# Patient Record
Sex: MALE | Race: WHITE | URBAN - METROPOLITAN AREA
[De-identification: names, ages, dates, MRNs, and addresses within clinical notes are randomized per-mention and may not be internally consistent; named-entity substitution may affect disease eponyms.]

---

## 2017-08-08 ENCOUNTER — THERAPY VISIT (OUTPATIENT)
Dept: PHYSICAL THERAPY | Facility: CLINIC | Age: 62
End: 2017-08-08
Payer: COMMERCIAL

## 2017-08-08 DIAGNOSIS — M54.12 CERVICAL RADICULOPATHY: Primary | ICD-10-CM

## 2017-08-08 PROCEDURE — 97140 MANUAL THERAPY 1/> REGIONS: CPT | Mod: GP | Performed by: PHYSICAL THERAPIST

## 2017-08-08 PROCEDURE — 97110 THERAPEUTIC EXERCISES: CPT | Mod: GP | Performed by: PHYSICAL THERAPIST

## 2017-08-08 PROCEDURE — 97161 PT EVAL LOW COMPLEX 20 MIN: CPT | Mod: GP | Performed by: PHYSICAL THERAPIST

## 2017-08-08 NOTE — PROGRESS NOTES
[facility] Initial Evaluation      Subjective:    Patient is a 61 year old male presenting with rehab cervical spine hpi.   Giovanni Sapp is a 61 year old male with a cervical spine condition.  Condition occurred with:  Insidious onset.    This is a recurrent and new condition  Patient saw MD on 8/7/17 for recent flare-up of neck pain on 7/31/17. Patient reports a previous episode of similar symptoms 2-3 years ago.    Site of Pain: R scapula, currently no neck pain.  Radiates to:  Upper arm right, lower arm right and hand right (middle finger).  Pain is described as burning, sharp, shooting and aching and is constant and reported as 4/10 and 7/10 (at its worst).  Associated symptoms:  Tingling, loss of motion/stiffness, loss of strength and numbness. Pain is worse in the A.M..  Symptoms are exacerbated by looking up or down, lying down, lifting, driving and sitting (shaving) Relieved by: bending head forward.  Since onset symptoms are gradually worsening.  Special testing: none.  Previous treatment includes physical therapy (2-3 years ago).  There was significant improvement following previous treatment.  General health as reported by patient is good.  Pertinent medical history includes:  Sleep disorder/apnea.  Medical allergies: yes (Latex).  Other surgeries include:  Orthopedic surgery (feet, s/p gall bladder removal).  Medication history: Dose medrol pack.  Current occupation is .  Patient is working in normal job without restrictions.  Primary job tasks include:  Prolonged sitting (computer).    Barriers include:  None as reported by the patient.    Red flags:  None as reported by the patient.                        Objective:          Flexibility/Screens:     Upper Extremity:    Decreased left upper extremity flexibility at:  Pectoralis Major and Pectoralis Minor    Decreased right upper extremity flexibility present at:  Pectoralis Major and Pectoralis Minor                       Cervical/Thoracic Evaluation      Cervical Myotomes:        C4 (shrug):  Left: 5    Right: 5  C5 (Deltoid):  Left: 5    Right: 4+  C6 (Biceps):  Left: 5    Right: 4+  C7 (Triceps):  Left: 5    Right: 4          Cervical Dermatomes:                C7 right:  Hypo-light touch              Spinal Segmental Conclusions:  Painful at C6,7  Level:  Hypo at T2, T1, C7, C6 and C5                                                Annel Cervical Evaluation    Posture:  Sitting: poor  Standing: poor  Protruding Head: yes  Wry Neck: no  Correction of Posture: worse    Movement Loss:  Protrusion (PRO): nil  Flexion (Flex): nil  Retraction (RET): min and pain  Extension (EXT): min and pain  Lateral Flexion Right (LF R): min  Lateral Flexion Left (LF L): mod and pain  Rotation Right (ROT R): min  Rotation Left (ROT L): min and pain  Test Movements:  Pretest Pain Sitting: R posterior upper arm, extensor surface forarm, hand  PRO: During: no effect  After: no effect  Mechanical Response: no effect  Repeat PRO: During: no effect  After: no effect  Mechanical Response: no effect  RET: During: increases  After: peripheralizing  Mechanical Response: no effect  Repeat RET: During: increases  After: peripheralizing  Mechanical Response: no effect  RET EXT: During: increases  After: peripheralizing  Mechanical Response: no effect  Repeat RET EXT: During: increases  After: peripheralizing  Mechanical Response: DecrROM  Pretest Pain Lying: R posterior upper arm, extensor surface forarm, hand  RET (Lying): During: increases  After: peripheralizing  Mechanical Response: no effect  Repeat RET (Lying): During: increases  After: peripheralizing  Mechanical Response: no effect    Pretest Pain Sitting: R posterior upper arm, extensor surface forarm, hand  LF R: During: no effect  After: no effect  Mechanical Response: no effect  Repeat LF R: During: decreases  After: better  Mechanical Response: no effect              Conclusion: derangement (vs  other)  Principle of Treatment:  Posture Correction: slouch/over correct, location of neutral spine, use of lumbar support, pt educationreagrding centralization, issue TYON book      Lateral: seated R SB  Other: consider traction(mechanical and/or manual)                                         ROS    Assessment/Plan:      Patient is a 61 year old male with cervical complaints.    Patient has the following significant findings with corresponding treatment plan.                Diagnosis 1:  Cervical radiculopathy  Pain -  hot/cold therapy, mechanical traction, manual therapy, splint/taping/bracing/orthotics, self management, education, directional preference exercise and home program  Decreased ROM/flexibility - manual therapy, therapeutic exercise and home program  Decreased joint mobility - manual therapy, therapeutic exercise, therapeutic activity and home program  Decreased strength - therapeutic exercise, therapeutic activities and home program  Decreased function - therapeutic activities and home program  Impaired posture - neuro re-education, therapeutic activities and home program    Therapy Evaluation Codes:   1) History comprised of:   Personal factors that impact the plan of care:      Profession.    Comorbidity factors that impact the plan of care are:      None.     Medications impacting care: None.  2) Examination of Body Systems comprised of:   Body structures and functions that impact the plan of care:      Cervical spine.   Activity limitations that impact the plan of care are:      Driving, Lifting, Reading/Computer work, Sitting and Working.  3) Clinical presentation characteristics are:   Stable/Uncomplicated.  4) Decision-Making    Low complexity using standardized patient assessment instrument and/or measureable assessment of functional outcome.  Cumulative Therapy Evaluation is: Low complexity.    Previous and current functional limitations:  (See Goal Flow Sheet for this information)    Short  term and Long term goals: (See Goal Flow Sheet for this information)     Communication ability:  Patient appears to be able to clearly communicate and understand verbal and written communication and follow directions correctly.  Treatment Explanation - The following has been discussed with the patient:   RX ordered/plan of care  Anticipated outcomes  Possible risks and side effects  This patient would benefit from PT intervention to resume normal activities.   Rehab potential is good.    Frequency:  2 X week, once daily  Duration:  for 3 weeks  Discharge Plan:  Achieve all LTG.  Independent in home treatment program.  Reach maximal therapeutic benefit.    Please refer to the daily flowsheet for treatment today, total treatment time and time spent performing 1:1 timed codes.

## 2017-08-08 NOTE — LETTER
[facility]  [address]  [place] [address]  [phone]    2017    Re: Giovanni Sapp   :   1955  MRN:  [id number]   REFERRING PHYSICIAN:   Burak Perez    [facility] PHYSICAL [facility]    Date of Initial Evaluation: 2017  Visits:  Rxs Used: 1  Reason for Referral:  Cervical radiculopathy    EVALUATION SUMMARY    Subjective:  Patient is a 61 year old male presenting with rehab cervical spine hpi.   Giovanni Sapp is a 61 year old male with a cervical spine condition.  Condition occurred with:  Insidious onset. This is a recurrent and new condition  Patient saw MD on 17 for recent flare-up of neck pain on 17. Patient reports a previous episode of similar symptoms 2-3 years ago.    Site of Pain: R scapula, currently no neck pain.  Radiates to:  Upper arm right, lower arm right and hand right (middle finger).  Pain is described as burning, sharp, shooting and aching and is constant and reported as 4/10 and 7/10 (at its worst).  Associated symptoms:  Tingling, loss of motion/stiffness, loss of strength and numbness. Pain is worse in the A.M..  Symptoms are exacerbated by looking up or down, lying down, lifting, driving and sitting (shaving) Relieved by: bending head forward.  Since onset symptoms are gradually worsening.  Special testing: none.  Previous treatment includes physical therapy (2-3 years ago).  There was significant improvement following previous treatment.  General health as reported by patient is good.  Pertinent medical history includes:  Sleep disorder/apnea.  Medical allergies: yes (Latex).  Other surgeries include:  Orthopedic surgery (feet, s/p gall bladder removal).  Medication history: Dose medrol pack.  Current occupation is .  Patient is working in normal job without restrictions.  Primary job tasks include:  Prolonged sitting  (computer).  Barriers include:  None as reported by the patient.  Red flags:  None as reported by the patient.                  Objective:  Flexibility/Screens:   Upper Extremity:    Decreased left upper extremity flexibility at:  Pectoralis Major and Pectoralis Minor  Decreased right upper extremity flexibility present at:  Pectoralis Major and Pectoralis Minor          Cervical/Thoracic Evaluation  Cervical Myotomes:    C4 (shrug):  Left: 5    Right: 5  C5 (Deltoid):  Left: 5    Right: 4+  Re: Giovanni Sapp   :   1955    C6 (Biceps):  Left: 5    Right: 4+  C7 (Triceps):  Left: 5    Right: 4  Cervical Dermatomes:    C7 right:  Hypo-light touch    Spinal Segmental Conclusions:  Painful at C6,7  Level:  Hypo at T2, T1, C7, C6 and C5    Annel Cervical Evaluation  Posture:  Sitting: poor  Standing: poor  Protruding Head: yes  Wry Neck: no  Correction of Posture: worse  Movement Loss:  Protrusion (PRO): nil  Flexion (Flex): nil  Retraction (RET): min and pain  Extension (EXT): min and pain  Lateral Flexion Right (LF R): min  Lateral Flexion Left (LF L): mod and pain  Rotation Right (ROT R): min  Rotation Left (ROT L): min and pain  Test Movements:  Pretest Pain Sitting: R posterior upper arm, extensor surface forarm, hand  PRO: During: no effect  After: no effect  Mechanical Response: no effect  Repeat PRO: During: no effect  After: no effect  Mechanical Response: no effect  RET: During: increases  After: peripheralizing  Mechanical Response: no effect  Repeat RET: During: increases  After: peripheralizing  Mechanical Response: no effect  RET EXT: During: increases  After: peripheralizing  Mechanical Response: no effect  Repeat RET EXT: During: increases  After: peripheralizing  Mechanical Response: DecrROM  Pretest Pain Lying: R posterior upper arm, extensor surface forarm, hand  RET (Lying): During: increases  After: peripheralizing  Mechanical Response: no effect  Repeat RET (Lying): During: increases   After: peripheralizing  Mechanical Response: no effect  Pretest Pain Sitting: R posterior upper arm, extensor surface forarm, hand  LF R: During: no effect  After: no effect  Mechanical Response: no effect  Repeat LF R: During: decreases  After: better  Mechanical Response: no effect  Conclusion: derangement (vs other)  Principle of Treatment:  Posture Correction: slouch/over correct, location of neutral spine, use of lumbar support, pt educationreagrding centralization, issue TYON book  Lateral: seated R SB  Other: consider traction(mechanical and/or manual)        Assessment/Plan:    Patient is a 61 year old male with cervical complaints.    Patient has the following significant findings with corresponding treatment plan.                Diagnosis 1:  Cervical radiculopathy  Pain -  hot/cold therapy, mechanical traction, manual therapy, splint/taping/bracing/orthotics, self management, education, directional   Re: Giovanni Sapp   :   1955    preference exercise and home program  Decreased ROM/flexibility - manual therapy, therapeutic exercise and home program  Decreased joint mobility - manual therapy, therapeutic exercise, therapeutic activity and home program  Decreased strength - therapeutic exercise, therapeutic activities and home program  Decreased function - therapeutic activities and home program  Impaired posture - neuro re-education, therapeutic activities and home program    Therapy Evaluation Codes:   1) History comprised of:   Personal factors that impact the plan of care:      Profession.    Comorbidity factors that impact the plan of care are:      None.     Medications impacting care: None.  2) Examination of Body Systems comprised of:   Body structures and functions that impact the plan of care:      Cervical spine.   Activity limitations that impact the plan of care are:      Driving, Lifting, Reading/Computer work, Sitting and Working.  3) Clinical presentation characteristics  are:   Stable/Uncomplicated.  4) Decision-Making    Low complexity using standardized patient assessment instrument and/or measureable assessment of functional outcome.  Cumulative Therapy Evaluation is: Low complexity.    Previous and current functional limitations:  (See Goal Flow Sheet for this information)    Short term and Long term goals: (See Goal Flow Sheet for this information)   Communication ability:  Patient appears to be able to clearly communicate and understand verbal and written communication and follow directions correctly.  Treatment Explanation - The following has been discussed with the patient:   RX ordered/plan of care  Anticipated outcomes  Possible risks and side effects  This patient would benefit from PT intervention to resume normal activities.   Rehab potential is good.  Frequency:  2 X week, once daily  Duration:  for 3 weeks  Discharge Plan:  Achieve all LTG.  Independent in home treatment program.  Reach maximal therapeutic benefit.     Thank you for your referral.    INQUIRIES  Therapist: Yana Arriola, PT  [facility]  [address] [address]  Phone: [phone]   Fax: [phone]

## 2017-08-08 NOTE — MR AVS SNAPSHOT
After Visit Summary   8/8/2017    Giovanni Sapp    MRN: [id number]           Patient Information     Date Of Birth          1955        Visit Information        Provider Department      8/8/2017 9:40 AM Yana Damico PT [facility] [employer] [facility] Physical [facility]        Today's Diagnoses     Cervical radiculopathy    -  1       Follow-ups after your visit        Your next 10 appointments already scheduled     Aug 11, 2017  9:00 AM CDT   ASIYA Spine with Yana Bautista PT   [facility] ([facility])    [address] [place] [address]   [phone]            Aug 15, 2017  1:30 PM CDT   ASIYA Spine with Yana Bautista PT   [facility] [employer] [facility] Physical [facility] ([facility])    [address] [place] [id number]   [phone]              Who to contact     If you have questions or need follow up information about today's clinic visit or your schedule please contact [facility] PHYSICAL [facility] directly at [phone].  Normal or non-critical lab and imaging results will be communicated to you by MyChart, letter or phone within 4 business days after the clinic has received the results. If you do not hear from us within 7 days, please contact the clinic through MyChart or phone. If you have a critical or abnormal lab result, we will notify you by phone as soon as possible.  Submit refill requests through [employer] or call your pharmacy and they will forward the refill request to us. Please allow 3 business days for your refill to be completed.          Additional Information About Your Visit        [employer] Information     [employer] lets you send messages to your doctor, view your test results, renew your prescriptions, schedule appointments and more. To  "sign up, go to [email or web address] . Click on \"Log in\" on the left side of the screen, which will take you to the Welcome page. Then click on \"Sign up Now\" on the right side of the page.     You will be asked to enter the access code listed below, as well as some personal information. Please follow the directions to create your username and password.     Your access code is: [email or web address]  Expires: 2017 12:34 AM     Your access code will  in 90 days. If you need help or a new code, please call your [facility] clinic or [phone].        Care EveryWhere ID     This is your Care EveryWhere ID. This could be used by other organizations to access your [facility] medical records  [id number]         Blood Pressure from Last 3 Encounters:   No data found for BP    Weight from Last 3 Encounters:   No data found for Wt              We Performed the Following     HC PT EVAL, LOW COMPLEXITY     ASIYA INITIAL EVAL REPORT     MANUAL THER TECH,1+REGIONS,EA 15 MIN     THERAPEUTIC EXERCISES        Primary Care Provider    None Specified       No primary provider on file.        Equal Access to Services     [facility]: Hadii cadence Lovett, watony earl, qamarissa watson, michael camarena . So [facility] [phone].    ATENCIÓN: Si habla español, tiene a fernández disposición servicios gratuitos de asistencia lingüística. Ednaame al [phone].    We comply with applicable federal civil rights laws and [place] laws. We do not discriminate on the basis of race, color, national origin, age, disability sex, sexual orientation or gender identity.            Thank you!     Thank you for choosing [facility] PHYSICAL THERAP[facility]  for your care. Our goal is always to provide you with excellent care. Hearing back from our patients is one way we can continue to improve our services. Please take a few minutes to complete the written survey that you may receive " in the mail after your visit with us. Thank you!             Your Updated Medication List - Protect others around you: Learn how to safely use, store and throw away your medicines at www.disposemymeds.org.      Notice  As of 8/8/2017 11:59 PM    You have not been prescribed any medications.

## 2017-08-09 PROBLEM — M54.12 CERVICAL RADICULOPATHY: Status: ACTIVE | Noted: 2017-08-09

## 2017-08-11 ENCOUNTER — THERAPY VISIT (OUTPATIENT)
Dept: PHYSICAL THERAPY | Facility: CLINIC | Age: 62
End: 2017-08-11
Payer: COMMERCIAL

## 2017-08-11 DIAGNOSIS — M54.12 CERVICAL RADICULOPATHY: ICD-10-CM

## 2017-08-11 PROCEDURE — 97140 MANUAL THERAPY 1/> REGIONS: CPT | Mod: GP | Performed by: PHYSICAL THERAPIST

## 2017-08-11 PROCEDURE — 97110 THERAPEUTIC EXERCISES: CPT | Mod: GP | Performed by: PHYSICAL THERAPIST

## 2017-08-15 ENCOUNTER — THERAPY VISIT (OUTPATIENT)
Dept: PHYSICAL THERAPY | Facility: CLINIC | Age: 62
End: 2017-08-15
Payer: COMMERCIAL

## 2017-08-15 DIAGNOSIS — M54.12 CERVICAL RADICULOPATHY: ICD-10-CM

## 2017-08-15 PROCEDURE — 97140 MANUAL THERAPY 1/> REGIONS: CPT | Mod: GP | Performed by: PHYSICAL THERAPIST

## 2017-08-15 PROCEDURE — 97110 THERAPEUTIC EXERCISES: CPT | Mod: GP | Performed by: PHYSICAL THERAPIST
